# Patient Record
Sex: FEMALE | Race: OTHER | Employment: OTHER | URBAN - METROPOLITAN AREA
[De-identification: names, ages, dates, MRNs, and addresses within clinical notes are randomized per-mention and may not be internally consistent; named-entity substitution may affect disease eponyms.]

---

## 2017-10-01 ENCOUNTER — HOSPITAL ENCOUNTER (EMERGENCY)
Facility: HOSPITAL | Age: 62
Discharge: HOME | End: 2017-10-01
Attending: EMERGENCY MEDICINE
Payer: MEDICARE

## 2017-10-01 VITALS
RESPIRATION RATE: 18 BRPM | HEIGHT: 59 IN | SYSTOLIC BLOOD PRESSURE: 134 MMHG | DIASTOLIC BLOOD PRESSURE: 89 MMHG | HEART RATE: 79 BPM | BODY MASS INDEX: 32.25 KG/M2 | WEIGHT: 160 LBS | TEMPERATURE: 97 F | OXYGEN SATURATION: 98 %

## 2017-10-01 DIAGNOSIS — T78.40XA ALLERGIC REACTION, INITIAL ENCOUNTER: Primary | ICD-10-CM

## 2017-10-01 PROCEDURE — 99283 EMERGENCY DEPT VISIT LOW MDM: CPT

## 2017-10-01 RX ORDER — PREDNISONE 20 MG/1
20 TABLET ORAL DAILY
Qty: 5 TABLET | Refills: 0 | Status: SHIPPED | OUTPATIENT
Start: 2017-10-01 | End: 2017-10-06

## 2017-10-01 RX ORDER — PREDNISONE 20 MG/1
60 TABLET ORAL ONCE
Status: COMPLETED | OUTPATIENT
Start: 2017-10-01 | End: 2017-10-01

## 2017-10-01 RX ORDER — DIPHENHYDRAMINE HCL 25 MG
25 CAPSULE ORAL ONCE
Status: COMPLETED | OUTPATIENT
Start: 2017-10-01 | End: 2017-10-01

## 2017-10-01 NOTE — ED PROVIDER NOTES
Patient Seen in: [facility] AND [facility] Emergency Department    History   Patient presents with:   Eye Visual Problem (opthalmic)    Stated Complaint: right eye swelling since friday     HPI    57-year-old female with history of GERD and hyperlipidemia present Yannick Michael ) 97.2 °F (36.2 °C) (Temporal)   Resp 18   Ht 149.9 cm (4' 11\")   Wt 72.6 kg   SpO2 98%   BMI 32.32 kg/m²         Physical Exam    General Appearance: alert, no distress  Eyes: pupils equal and round no pallor.   There is periorbital and conjunctival reza

## 2017-10-01 NOTE — ED NOTES
Pt presents to ed31 from home for right sided eye/facial swelling w/ redness. Pt denies trauma. Denies pain. States she noticed the swelling on Friday and it has gotten worse each day. Pt is aox4 w/ full rom, speaking in full sentences.

## 2018-08-12 ENCOUNTER — HOSPITAL ENCOUNTER (EMERGENCY)
Facility: HOSPITAL | Age: 63
Discharge: HOME | End: 2018-08-12
Attending: EMERGENCY MEDICINE
Payer: MEDICARE

## 2018-08-12 ENCOUNTER — APPOINTMENT (OUTPATIENT)
Dept: CT IMAGING | Facility: HOSPITAL | Age: 63
End: 2018-08-12
Attending: EMERGENCY MEDICINE
Payer: MEDICARE

## 2018-08-12 VITALS
SYSTOLIC BLOOD PRESSURE: 113 MMHG | DIASTOLIC BLOOD PRESSURE: 62 MMHG | OXYGEN SATURATION: 98 % | WEIGHT: 160 LBS | RESPIRATION RATE: 18 BRPM | HEART RATE: 79 BPM | TEMPERATURE: 99 F | BODY MASS INDEX: 32 KG/M2

## 2018-08-12 DIAGNOSIS — R10.9 ABDOMINAL PAIN OF UNKNOWN ETIOLOGY: Primary | ICD-10-CM

## 2018-08-12 LAB
ALBUMIN SERPL BCP-MCNC: 4.2 G/DL (ref 3.5–4.8)
ALP SERPL-CCNC: 88 U/L (ref 32–100)
ALT SERPL-CCNC: 28 U/L (ref 14–54)
ANION GAP SERPL CALC-SCNC: 9 MMOL/L (ref 0–18)
AST SERPL-CCNC: 30 U/L (ref 15–41)
BACTERIA UR QL AUTO: NEGATIVE /HPF
BASOPHILS # BLD: 0 K/UL (ref 0–0.2)
BASOPHILS NFR BLD: 1 %
BILIRUB DIRECT SERPL-MCNC: 0.2 MG/DL (ref 0–0.2)
BILIRUB SERPL-MCNC: 1.1 MG/DL (ref 0.3–1.2)
BILIRUB UR QL: NEGATIVE
BUN SERPL-MCNC: 13 MG/DL (ref 8–20)
BUN/CREAT SERPL: 17.3 (ref 10–20)
CALCIUM SERPL-MCNC: 9.4 MG/DL (ref 8.5–10.5)
CHLORIDE SERPL-SCNC: 103 MMOL/L (ref 95–110)
CLARITY UR: CLEAR
CO2 SERPL-SCNC: 26 MMOL/L (ref 22–32)
COLOR UR: YELLOW
CREAT SERPL-MCNC: 0.75 MG/DL (ref 0.5–1.5)
EOSINOPHIL # BLD: 0.2 K/UL (ref 0–0.7)
EOSINOPHIL NFR BLD: 3 %
ERYTHROCYTE [DISTWIDTH] IN BLOOD BY AUTOMATED COUNT: 14.6 % (ref 11–15)
GLUCOSE SERPL-MCNC: 105 MG/DL (ref 70–99)
GLUCOSE UR-MCNC: NEGATIVE MG/DL
HCT VFR BLD AUTO: 37.8 % (ref 35–48)
HGB BLD-MCNC: 12.9 G/DL (ref 12–16)
HGB UR QL STRIP.AUTO: NEGATIVE
INR BLD: 1 (ref 0.9–1.2)
KETONES UR-MCNC: NEGATIVE MG/DL
LIPASE SERPL-CCNC: 37 U/L (ref 22–51)
LYMPHOCYTES # BLD: 2.1 K/UL (ref 1–4)
LYMPHOCYTES NFR BLD: 35 %
MCH RBC QN AUTO: 30.2 PG (ref 27–32)
MCHC RBC AUTO-ENTMCNC: 34.2 G/DL (ref 32–37)
MCV RBC AUTO: 88.3 FL (ref 80–100)
MONOCYTES # BLD: 0.6 K/UL (ref 0–1)
MONOCYTES NFR BLD: 10 %
NEUTROPHILS # BLD AUTO: 3 K/UL (ref 1.8–7.7)
NEUTROPHILS NFR BLD: 52 %
NITRITE UR QL STRIP.AUTO: NEGATIVE
OSMOLALITY UR CALC.SUM OF ELEC: 286 MOSM/KG (ref 275–295)
PH UR: 6 [PH] (ref 5–8)
PLATELET # BLD AUTO: 194 K/UL (ref 140–400)
PMV BLD AUTO: 9.2 FL (ref 7.4–10.3)
POTASSIUM SERPL-SCNC: 3.7 MMOL/L (ref 3.3–5.1)
PROT SERPL-MCNC: 7.4 G/DL (ref 5.9–8.4)
PROT UR-MCNC: NEGATIVE MG/DL
PROTHROMBIN TIME: 13.1 SECONDS (ref 11.8–14.5)
RBC # BLD AUTO: 4.28 M/UL (ref 3.7–5.4)
RBC #/AREA URNS AUTO: <1 /HPF
SODIUM SERPL-SCNC: 138 MMOL/L (ref 136–144)
SP GR UR STRIP: 1.01 (ref 1–1.03)
UROBILINOGEN UR STRIP-ACNC: <2
VIT C UR-MCNC: NEGATIVE MG/DL
WBC # BLD AUTO: 5.9 K/UL (ref 4–11)
WBC #/AREA URNS AUTO: 1 /HPF

## 2018-08-12 PROCEDURE — 85025 COMPLETE CBC W/AUTO DIFF WBC: CPT | Performed by: EMERGENCY MEDICINE

## 2018-08-12 PROCEDURE — 99285 EMERGENCY DEPT VISIT HI MDM: CPT

## 2018-08-12 PROCEDURE — 85610 PROTHROMBIN TIME: CPT | Performed by: EMERGENCY MEDICINE

## 2018-08-12 PROCEDURE — 83690 ASSAY OF LIPASE: CPT | Performed by: EMERGENCY MEDICINE

## 2018-08-12 PROCEDURE — 74176 CT ABD & PELVIS W/O CONTRAST: CPT | Performed by: EMERGENCY MEDICINE

## 2018-08-12 PROCEDURE — 81001 URINALYSIS AUTO W/SCOPE: CPT | Performed by: EMERGENCY MEDICINE

## 2018-08-12 PROCEDURE — 36415 COLL VENOUS BLD VENIPUNCTURE: CPT

## 2018-08-12 PROCEDURE — 80076 HEPATIC FUNCTION PANEL: CPT | Performed by: EMERGENCY MEDICINE

## 2018-08-12 PROCEDURE — 80048 BASIC METABOLIC PNL TOTAL CA: CPT | Performed by: EMERGENCY MEDICINE

## 2018-08-12 RX ORDER — DICYCLOMINE HYDROCHLORIDE 10 MG/5ML
20 SOLUTION ORAL ONCE
Status: COMPLETED | OUTPATIENT
Start: 2018-08-12 | End: 2018-08-12

## 2018-08-13 NOTE — ED PROVIDER NOTES
Patient Seen in: [facility] AND [facility] Emergency Department    History   Patient presents with:  Abdomen/Flank Pain (GI/)    Stated Complaint: abd pain     HPI    58-year-old female with history of appendectomy and cholecystectomy who for the last 4-5 days are equal, round, and reactive to light. Cardiovascular: Normal rate, regular rhythm and intact distal pulses. Pulmonary/Chest: Effort normal. No respiratory distress. Abdominal: Soft. Tenderness to palpation of lower abdominal areas.   Mild low philip acute appendicitis. Findings consistent with atrophic left kidney. Right kidney shows stable mild hydronephrosis and hydroureter. No calcifications in the collecting system. Liver, pancreas and spleen are unremarkable.   Status post cholecystectomy   No

## 2018-08-13 NOTE — ED INITIAL ASSESSMENT (HPI)
Patient complains of abdominal discomfort and bloating. Denies constipation. +nausea since last night. No vomiting.  +chills.

## 2020-02-04 ENCOUNTER — HOSPITAL (OUTPATIENT)
Dept: OTHER | Age: 65
End: 2020-02-04

## 2020-02-10 ENCOUNTER — HOSPITAL ENCOUNTER (OUTPATIENT)
Dept: CARDIAC REHAB | Age: 65
Discharge: STILL ADMITTED OR ADMINISTRATIVE | End: 2020-02-10

## 2020-02-10 PROCEDURE — 93798 PHYS/QHP OP CAR RHAB W/ECG: CPT

## 2020-02-12 ENCOUNTER — HOSPITAL ENCOUNTER (OUTPATIENT)
Dept: CARDIAC REHAB | Age: 65
Discharge: STILL ADMITTED OR ADMINISTRATIVE | End: 2020-02-12

## 2020-02-12 PROCEDURE — 93798 PHYS/QHP OP CAR RHAB W/ECG: CPT

## 2020-02-13 ENCOUNTER — HOSPITAL ENCOUNTER (OUTPATIENT)
Dept: CARDIAC REHAB | Age: 65
Discharge: STILL ADMITTED OR ADMINISTRATIVE | End: 2020-02-13

## 2020-02-17 ENCOUNTER — HOSPITAL ENCOUNTER (OUTPATIENT)
Dept: CARDIAC REHAB | Age: 65
Discharge: STILL ADMITTED OR ADMINISTRATIVE | End: 2020-02-17

## 2020-02-17 PROCEDURE — 93798 PHYS/QHP OP CAR RHAB W/ECG: CPT

## 2020-02-18 DIAGNOSIS — Z95.2 S/P AORTIC VALVE REPLACEMENT: Primary | ICD-10-CM

## 2020-02-18 PROCEDURE — 93798 PHYS/QHP OP CAR RHAB W/ECG: CPT

## 2020-02-19 ENCOUNTER — HOSPITAL ENCOUNTER (OUTPATIENT)
Dept: CARDIAC REHAB | Age: 65
Discharge: STILL ADMITTED OR ADMINISTRATIVE | End: 2020-02-19

## 2020-02-19 DIAGNOSIS — Z95.2 S/P AORTIC VALVE REPLACEMENT: ICD-10-CM

## 2020-02-19 PROCEDURE — 93798 PHYS/QHP OP CAR RHAB W/ECG: CPT

## 2020-02-20 ENCOUNTER — HOSPITAL ENCOUNTER (OUTPATIENT)
Dept: CARDIAC REHAB | Age: 65
Discharge: STILL ADMITTED OR ADMINISTRATIVE | End: 2020-02-20

## 2020-02-20 PROCEDURE — 93798 PHYS/QHP OP CAR RHAB W/ECG: CPT

## 2020-02-24 ENCOUNTER — APPOINTMENT (OUTPATIENT)
Dept: CARDIAC REHAB | Age: 65
End: 2020-02-24

## 2020-02-24 ENCOUNTER — TELEPHONE (OUTPATIENT)
Dept: CARDIAC REHAB | Age: 65
End: 2020-02-24

## 2020-02-26 ENCOUNTER — HOSPITAL ENCOUNTER (OUTPATIENT)
Dept: CARDIAC REHAB | Age: 65
Discharge: STILL ADMITTED OR ADMINISTRATIVE | End: 2020-02-26

## 2020-02-26 PROCEDURE — 93798 PHYS/QHP OP CAR RHAB W/ECG: CPT

## 2020-02-27 ENCOUNTER — HOSPITAL ENCOUNTER (OUTPATIENT)
Dept: CARDIAC REHAB | Age: 65
Discharge: STILL ADMITTED OR ADMINISTRATIVE | End: 2020-02-27

## 2020-02-27 PROCEDURE — 93798 PHYS/QHP OP CAR RHAB W/ECG: CPT

## 2020-03-02 ENCOUNTER — HOSPITAL ENCOUNTER (OUTPATIENT)
Dept: CARDIAC REHAB | Age: 65
Discharge: STILL ADMITTED OR ADMINISTRATIVE | End: 2020-03-02

## 2020-03-02 PROCEDURE — 93798 PHYS/QHP OP CAR RHAB W/ECG: CPT

## 2020-03-04 ENCOUNTER — HOSPITAL ENCOUNTER (OUTPATIENT)
Dept: CARDIAC REHAB | Age: 65
Discharge: STILL ADMITTED OR ADMINISTRATIVE | End: 2020-03-04

## 2020-03-04 PROCEDURE — 93798 PHYS/QHP OP CAR RHAB W/ECG: CPT

## 2020-03-05 ENCOUNTER — HOSPITAL ENCOUNTER (OUTPATIENT)
Dept: CARDIAC REHAB | Age: 65
Discharge: STILL ADMITTED OR ADMINISTRATIVE | End: 2020-03-05

## 2020-03-05 PROCEDURE — 93798 PHYS/QHP OP CAR RHAB W/ECG: CPT

## 2020-03-09 ENCOUNTER — HOSPITAL ENCOUNTER (OUTPATIENT)
Dept: CARDIAC REHAB | Age: 65
Discharge: STILL ADMITTED OR ADMINISTRATIVE | End: 2020-03-09

## 2020-03-09 PROCEDURE — 93798 PHYS/QHP OP CAR RHAB W/ECG: CPT

## 2020-03-11 ENCOUNTER — HOSPITAL ENCOUNTER (OUTPATIENT)
Dept: CARDIAC REHAB | Age: 65
Discharge: STILL ADMITTED OR ADMINISTRATIVE | End: 2020-03-11

## 2020-03-11 PROCEDURE — 93798 PHYS/QHP OP CAR RHAB W/ECG: CPT

## 2020-03-12 ENCOUNTER — HOSPITAL ENCOUNTER (OUTPATIENT)
Dept: CARDIAC REHAB | Age: 65
Discharge: STILL ADMITTED OR ADMINISTRATIVE | End: 2020-03-12

## 2020-03-12 PROCEDURE — 93798 PHYS/QHP OP CAR RHAB W/ECG: CPT

## 2020-03-16 ENCOUNTER — HOSPITAL ENCOUNTER (OUTPATIENT)
Dept: CARDIAC REHAB | Age: 65
Discharge: STILL ADMITTED OR ADMINISTRATIVE | End: 2020-03-16

## 2020-03-16 PROCEDURE — 93798 PHYS/QHP OP CAR RHAB W/ECG: CPT

## 2020-03-18 ENCOUNTER — APPOINTMENT (OUTPATIENT)
Dept: CARDIAC REHAB | Age: 65
End: 2020-03-18

## 2020-03-19 ENCOUNTER — APPOINTMENT (OUTPATIENT)
Dept: CARDIAC REHAB | Age: 65
End: 2020-03-19

## 2020-03-23 ENCOUNTER — APPOINTMENT (OUTPATIENT)
Dept: CARDIAC REHAB | Age: 65
End: 2020-03-23

## 2020-03-25 ENCOUNTER — APPOINTMENT (OUTPATIENT)
Dept: CARDIAC REHAB | Age: 65
End: 2020-03-25

## 2020-03-26 ENCOUNTER — APPOINTMENT (OUTPATIENT)
Dept: CARDIAC REHAB | Age: 65
End: 2020-03-26

## 2020-03-30 ENCOUNTER — TELEPHONE (OUTPATIENT)
Dept: CARDIAC REHAB | Age: 65
End: 2020-03-30

## 2020-03-30 ENCOUNTER — APPOINTMENT (OUTPATIENT)
Dept: CARDIAC REHAB | Age: 65
End: 2020-03-30

## 2020-04-01 ENCOUNTER — APPOINTMENT (OUTPATIENT)
Dept: CARDIAC REHAB | Age: 65
End: 2020-04-01

## 2020-04-01 ENCOUNTER — TELEPHONE (OUTPATIENT)
Dept: CARDIAC REHAB | Age: 65
End: 2020-04-01

## 2020-04-02 ENCOUNTER — APPOINTMENT (OUTPATIENT)
Dept: CARDIAC REHAB | Age: 65
End: 2020-04-02

## 2020-04-06 ENCOUNTER — APPOINTMENT (OUTPATIENT)
Dept: CARDIAC REHAB | Age: 65
End: 2020-04-06

## 2020-04-08 ENCOUNTER — APPOINTMENT (OUTPATIENT)
Dept: CARDIAC REHAB | Age: 65
End: 2020-04-08

## 2020-04-09 ENCOUNTER — APPOINTMENT (OUTPATIENT)
Dept: CARDIAC REHAB | Age: 65
End: 2020-04-09

## 2020-04-13 ENCOUNTER — APPOINTMENT (OUTPATIENT)
Dept: CARDIAC REHAB | Age: 65
End: 2020-04-13

## 2020-04-15 ENCOUNTER — APPOINTMENT (OUTPATIENT)
Dept: CARDIAC REHAB | Age: 65
End: 2020-04-15

## 2020-04-15 ENCOUNTER — TELEPHONE (OUTPATIENT)
Dept: CARDIAC REHAB | Age: 65
End: 2020-04-15

## 2020-04-16 ENCOUNTER — APPOINTMENT (OUTPATIENT)
Dept: CARDIAC REHAB | Age: 65
End: 2020-04-16

## 2020-04-20 ENCOUNTER — APPOINTMENT (OUTPATIENT)
Dept: CARDIAC REHAB | Age: 65
End: 2020-04-20

## 2020-04-22 ENCOUNTER — APPOINTMENT (OUTPATIENT)
Dept: CARDIAC REHAB | Age: 65
End: 2020-04-22

## 2020-04-22 ENCOUNTER — TELEPHONE (OUTPATIENT)
Dept: CARDIAC REHAB | Age: 65
End: 2020-04-22

## 2020-04-23 ENCOUNTER — APPOINTMENT (OUTPATIENT)
Dept: CARDIAC REHAB | Age: 65
End: 2020-04-23

## 2020-04-27 ENCOUNTER — APPOINTMENT (OUTPATIENT)
Dept: CARDIAC REHAB | Age: 65
End: 2020-04-27

## 2020-04-29 ENCOUNTER — APPOINTMENT (OUTPATIENT)
Dept: CARDIAC REHAB | Age: 65
End: 2020-04-29

## 2020-04-29 ENCOUNTER — TELEPHONE (OUTPATIENT)
Dept: CARDIAC REHAB | Age: 65
End: 2020-04-29

## 2020-04-30 ENCOUNTER — APPOINTMENT (OUTPATIENT)
Dept: CARDIAC REHAB | Age: 65
End: 2020-04-30

## 2020-05-13 ENCOUNTER — APPOINTMENT (OUTPATIENT)
Dept: CARDIAC REHAB | Age: 65
End: 2020-05-13
Attending: INTERNAL MEDICINE

## 2020-05-14 ENCOUNTER — APPOINTMENT (OUTPATIENT)
Dept: CARDIAC REHAB | Age: 65
End: 2020-05-14

## 2020-05-15 ENCOUNTER — TELEPHONE (OUTPATIENT)
Dept: CARDIAC REHAB | Age: 65
End: 2020-05-15

## 2020-05-18 ENCOUNTER — APPOINTMENT (OUTPATIENT)
Dept: CARDIAC REHAB | Age: 65
End: 2020-05-18

## 2020-05-20 ENCOUNTER — APPOINTMENT (OUTPATIENT)
Dept: CARDIAC REHAB | Age: 65
End: 2020-05-20

## 2020-05-21 ENCOUNTER — APPOINTMENT (OUTPATIENT)
Dept: CARDIAC REHAB | Age: 65
End: 2020-05-21

## 2020-05-27 ENCOUNTER — APPOINTMENT (OUTPATIENT)
Dept: CARDIAC REHAB | Age: 65
End: 2020-05-27

## 2020-05-28 ENCOUNTER — APPOINTMENT (OUTPATIENT)
Dept: CARDIAC REHAB | Age: 65
End: 2020-05-28

## 2020-05-30 ENCOUNTER — TELEPHONE (OUTPATIENT)
Dept: CARDIAC REHAB | Age: 65
End: 2020-05-30

## 2020-06-01 ENCOUNTER — APPOINTMENT (OUTPATIENT)
Dept: CARDIAC REHAB | Age: 65
End: 2020-06-01
Attending: INTERNAL MEDICINE

## 2020-06-03 ENCOUNTER — APPOINTMENT (OUTPATIENT)
Dept: CARDIAC REHAB | Age: 65
End: 2020-06-03

## 2020-06-04 ENCOUNTER — APPOINTMENT (OUTPATIENT)
Dept: CARDIAC REHAB | Age: 65
End: 2020-06-04

## 2020-06-08 ENCOUNTER — APPOINTMENT (OUTPATIENT)
Dept: CARDIAC REHAB | Age: 65
End: 2020-06-08

## 2020-06-10 ENCOUNTER — APPOINTMENT (OUTPATIENT)
Dept: CARDIAC REHAB | Age: 65
End: 2020-06-10

## 2020-06-11 ENCOUNTER — TELEPHONE (OUTPATIENT)
Dept: CARDIAC REHAB | Age: 65
End: 2020-06-11

## 2020-06-11 ENCOUNTER — APPOINTMENT (OUTPATIENT)
Dept: CARDIAC REHAB | Age: 65
End: 2020-06-11

## 2020-06-15 ENCOUNTER — APPOINTMENT (OUTPATIENT)
Dept: CARDIAC REHAB | Age: 65
End: 2020-06-15
Attending: INTERNAL MEDICINE

## 2020-06-17 ENCOUNTER — APPOINTMENT (OUTPATIENT)
Dept: CARDIAC REHAB | Age: 65
End: 2020-06-17

## 2020-07-06 ENCOUNTER — HOSPITAL ENCOUNTER (OUTPATIENT)
Dept: CARDIAC REHAB | Age: 65
Discharge: STILL ADMITTED OR ADMINISTRATIVE | End: 2020-07-06

## 2020-07-06 ENCOUNTER — APPOINTMENT (OUTPATIENT)
Dept: CARDIAC REHAB | Age: 65
End: 2020-07-06
Attending: INTERNAL MEDICINE

## 2020-07-06 PROCEDURE — 93798 PHYS/QHP OP CAR RHAB W/ECG: CPT

## 2020-07-08 ENCOUNTER — APPOINTMENT (OUTPATIENT)
Dept: CARDIAC REHAB | Age: 65
End: 2020-07-08

## 2020-07-08 ENCOUNTER — HOSPITAL ENCOUNTER (OUTPATIENT)
Dept: CARDIAC REHAB | Age: 65
Discharge: STILL ADMITTED OR ADMINISTRATIVE | End: 2020-07-08

## 2020-07-08 PROCEDURE — 93798 PHYS/QHP OP CAR RHAB W/ECG: CPT

## 2020-07-09 ENCOUNTER — APPOINTMENT (OUTPATIENT)
Dept: CARDIAC REHAB | Age: 65
End: 2020-07-09

## 2020-07-13 ENCOUNTER — APPOINTMENT (OUTPATIENT)
Dept: CARDIAC REHAB | Age: 65
End: 2020-07-13

## 2020-07-13 ENCOUNTER — HOSPITAL ENCOUNTER (OUTPATIENT)
Dept: CARDIAC REHAB | Age: 65
Discharge: STILL ADMITTED OR ADMINISTRATIVE | End: 2020-07-13

## 2020-07-13 PROCEDURE — 93798 PHYS/QHP OP CAR RHAB W/ECG: CPT

## 2020-07-15 ENCOUNTER — APPOINTMENT (OUTPATIENT)
Dept: CARDIAC REHAB | Age: 65
End: 2020-07-15

## 2020-07-15 ENCOUNTER — HOSPITAL ENCOUNTER (OUTPATIENT)
Dept: CARDIAC REHAB | Age: 65
Discharge: STILL ADMITTED OR ADMINISTRATIVE | End: 2020-07-15

## 2020-07-15 PROCEDURE — 93798 PHYS/QHP OP CAR RHAB W/ECG: CPT

## 2020-07-16 ENCOUNTER — APPOINTMENT (OUTPATIENT)
Dept: CARDIAC REHAB | Age: 65
End: 2020-07-16

## 2020-07-20 ENCOUNTER — APPOINTMENT (OUTPATIENT)
Dept: CARDIAC REHAB | Age: 65
End: 2020-07-20

## 2020-07-20 ENCOUNTER — HOSPITAL ENCOUNTER (OUTPATIENT)
Dept: CARDIAC REHAB | Age: 65
Discharge: STILL ADMITTED OR ADMINISTRATIVE | End: 2020-07-20

## 2020-07-20 PROCEDURE — 93798 PHYS/QHP OP CAR RHAB W/ECG: CPT

## 2020-07-22 ENCOUNTER — APPOINTMENT (OUTPATIENT)
Dept: CARDIAC REHAB | Age: 65
End: 2020-07-22

## 2020-07-22 ENCOUNTER — HOSPITAL ENCOUNTER (OUTPATIENT)
Dept: CARDIAC REHAB | Age: 65
Discharge: STILL ADMITTED OR ADMINISTRATIVE | End: 2020-07-22

## 2020-07-22 PROCEDURE — 93798 PHYS/QHP OP CAR RHAB W/ECG: CPT

## 2020-07-23 ENCOUNTER — APPOINTMENT (OUTPATIENT)
Dept: CARDIAC REHAB | Age: 65
End: 2020-07-23

## 2020-07-27 ENCOUNTER — APPOINTMENT (OUTPATIENT)
Dept: CARDIAC REHAB | Age: 65
End: 2020-07-27

## 2020-07-27 ENCOUNTER — HOSPITAL ENCOUNTER (OUTPATIENT)
Dept: CARDIAC REHAB | Age: 65
Discharge: STILL ADMITTED OR ADMINISTRATIVE | End: 2020-07-27

## 2020-07-27 PROCEDURE — 93798 PHYS/QHP OP CAR RHAB W/ECG: CPT

## 2020-07-29 ENCOUNTER — APPOINTMENT (OUTPATIENT)
Dept: CARDIAC REHAB | Age: 65
End: 2020-07-29

## 2020-07-29 ENCOUNTER — HOSPITAL ENCOUNTER (OUTPATIENT)
Dept: CARDIAC REHAB | Age: 65
Discharge: STILL ADMITTED OR ADMINISTRATIVE | End: 2020-07-29

## 2020-07-29 PROCEDURE — 93798 PHYS/QHP OP CAR RHAB W/ECG: CPT

## 2020-07-30 ENCOUNTER — APPOINTMENT (OUTPATIENT)
Dept: CARDIAC REHAB | Age: 65
End: 2020-07-30

## 2020-08-03 ENCOUNTER — APPOINTMENT (OUTPATIENT)
Dept: CARDIAC REHAB | Age: 65
End: 2020-08-03

## 2020-08-03 ENCOUNTER — TELEPHONE (OUTPATIENT)
Dept: CARDIAC REHAB | Age: 65
End: 2020-08-03

## 2020-08-05 ENCOUNTER — APPOINTMENT (OUTPATIENT)
Dept: CARDIAC REHAB | Age: 65
End: 2020-08-05

## 2020-08-05 ENCOUNTER — TELEPHONE (OUTPATIENT)
Dept: CARDIAC REHAB | Age: 65
End: 2020-08-05

## 2020-08-06 ENCOUNTER — APPOINTMENT (OUTPATIENT)
Dept: CARDIAC REHAB | Age: 65
End: 2020-08-06

## 2020-08-07 ENCOUNTER — TELEPHONE (OUTPATIENT)
Dept: CARDIAC REHAB | Age: 65
End: 2020-08-07

## 2020-08-10 ENCOUNTER — APPOINTMENT (OUTPATIENT)
Dept: CARDIAC REHAB | Age: 65
End: 2020-08-10

## 2020-08-12 ENCOUNTER — APPOINTMENT (OUTPATIENT)
Dept: CARDIAC REHAB | Age: 65
End: 2020-08-12

## 2020-08-13 ENCOUNTER — APPOINTMENT (OUTPATIENT)
Dept: CARDIAC REHAB | Age: 65
End: 2020-08-13

## 2020-08-17 ENCOUNTER — APPOINTMENT (OUTPATIENT)
Dept: CARDIAC REHAB | Age: 65
End: 2020-08-17

## 2020-08-17 ENCOUNTER — HOSPITAL ENCOUNTER (OUTPATIENT)
Dept: CARDIAC REHAB | Age: 65
Discharge: STILL ADMITTED OR ADMINISTRATIVE | End: 2020-08-17
Attending: INTERNAL MEDICINE

## 2020-08-17 PROCEDURE — 93798 PHYS/QHP OP CAR RHAB W/ECG: CPT

## 2020-08-18 ENCOUNTER — HOSPITAL ENCOUNTER (OUTPATIENT)
Dept: CARDIAC REHAB | Age: 65
Discharge: STILL ADMITTED OR ADMINISTRATIVE | End: 2020-08-18
Attending: INTERNAL MEDICINE

## 2020-08-18 PROCEDURE — 93798 PHYS/QHP OP CAR RHAB W/ECG: CPT

## 2020-08-19 ENCOUNTER — APPOINTMENT (OUTPATIENT)
Dept: CARDIAC REHAB | Age: 65
End: 2020-08-19

## 2020-08-19 ENCOUNTER — HOSPITAL ENCOUNTER (OUTPATIENT)
Dept: CARDIAC REHAB | Age: 65
Discharge: STILL ADMITTED OR ADMINISTRATIVE | End: 2020-08-19
Attending: INTERNAL MEDICINE

## 2020-08-19 PROCEDURE — 93798 PHYS/QHP OP CAR RHAB W/ECG: CPT

## 2020-08-20 ENCOUNTER — HOSPITAL ENCOUNTER (OUTPATIENT)
Dept: CARDIAC REHAB | Age: 65
Discharge: STILL ADMITTED OR ADMINISTRATIVE | End: 2020-08-20
Attending: INTERNAL MEDICINE

## 2020-08-20 PROCEDURE — 93798 PHYS/QHP OP CAR RHAB W/ECG: CPT

## 2020-08-24 ENCOUNTER — APPOINTMENT (OUTPATIENT)
Dept: CARDIAC REHAB | Age: 65
End: 2020-08-24

## 2020-08-25 ENCOUNTER — HOSPITAL ENCOUNTER (OUTPATIENT)
Dept: CARDIAC REHAB | Age: 65
Discharge: STILL ADMITTED OR ADMINISTRATIVE | End: 2020-08-25
Attending: INTERNAL MEDICINE

## 2020-08-25 PROCEDURE — 93798 PHYS/QHP OP CAR RHAB W/ECG: CPT

## 2020-08-26 ENCOUNTER — APPOINTMENT (OUTPATIENT)
Dept: CARDIAC REHAB | Age: 65
End: 2020-08-26

## 2020-08-27 ENCOUNTER — HOSPITAL ENCOUNTER (OUTPATIENT)
Dept: CARDIAC REHAB | Age: 65
Discharge: STILL ADMITTED OR ADMINISTRATIVE | End: 2020-08-27
Attending: INTERNAL MEDICINE

## 2020-08-27 PROCEDURE — 93798 PHYS/QHP OP CAR RHAB W/ECG: CPT

## 2020-08-31 ENCOUNTER — APPOINTMENT (OUTPATIENT)
Dept: CARDIAC REHAB | Age: 65
End: 2020-08-31

## 2020-09-01 ENCOUNTER — HOSPITAL ENCOUNTER (OUTPATIENT)
Dept: CARDIAC REHAB | Age: 65
Discharge: STILL ADMITTED OR ADMINISTRATIVE | End: 2020-09-01
Attending: INTERNAL MEDICINE

## 2020-09-01 PROCEDURE — 93798 PHYS/QHP OP CAR RHAB W/ECG: CPT

## 2020-09-02 ENCOUNTER — APPOINTMENT (OUTPATIENT)
Dept: CARDIAC REHAB | Age: 65
End: 2020-09-02

## 2020-09-03 ENCOUNTER — HOSPITAL ENCOUNTER (OUTPATIENT)
Dept: CARDIAC REHAB | Age: 65
Discharge: STILL ADMITTED OR ADMINISTRATIVE | End: 2020-09-03
Attending: INTERNAL MEDICINE

## 2020-09-03 PROCEDURE — 93798 PHYS/QHP OP CAR RHAB W/ECG: CPT

## 2020-09-08 ENCOUNTER — HOSPITAL ENCOUNTER (OUTPATIENT)
Dept: CARDIAC REHAB | Age: 65
Discharge: STILL ADMITTED OR ADMINISTRATIVE | End: 2020-09-08
Attending: INTERNAL MEDICINE

## 2020-09-08 PROCEDURE — 93798 PHYS/QHP OP CAR RHAB W/ECG: CPT

## 2020-09-09 ENCOUNTER — HOSPITAL ENCOUNTER (OUTPATIENT)
Dept: CARDIAC REHAB | Age: 65
Discharge: STILL ADMITTED OR ADMINISTRATIVE | End: 2020-09-09
Attending: INTERNAL MEDICINE

## 2020-09-09 ENCOUNTER — APPOINTMENT (OUTPATIENT)
Dept: CARDIAC REHAB | Age: 65
End: 2020-09-09
Attending: INTERNAL MEDICINE

## 2020-09-09 ENCOUNTER — APPOINTMENT (OUTPATIENT)
Dept: CARDIAC REHAB | Age: 65
End: 2020-09-09

## 2020-09-09 PROCEDURE — 93798 PHYS/QHP OP CAR RHAB W/ECG: CPT

## 2020-09-10 ENCOUNTER — APPOINTMENT (OUTPATIENT)
Dept: CARDIAC REHAB | Age: 65
End: 2020-09-10

## 2020-09-14 ENCOUNTER — APPOINTMENT (OUTPATIENT)
Dept: CARDIAC REHAB | Age: 65
End: 2020-09-14

## 2021-05-11 ENCOUNTER — OFFICE VISIT (OUTPATIENT)
Dept: FAMILY MEDICINE CLINIC | Facility: CLINIC | Age: 66
End: 2021-05-11
Payer: MEDICARE

## 2021-05-11 VITALS
WEIGHT: 166 LBS | TEMPERATURE: 98 F | HEIGHT: 61 IN | RESPIRATION RATE: 14 BRPM | BODY MASS INDEX: 31.34 KG/M2 | OXYGEN SATURATION: 98 % | SYSTOLIC BLOOD PRESSURE: 138 MMHG | HEART RATE: 76 BPM | DIASTOLIC BLOOD PRESSURE: 78 MMHG

## 2021-05-11 DIAGNOSIS — L08.9 LOCAL SKIN INFECTION: Primary | ICD-10-CM

## 2021-05-11 PROCEDURE — 99202 OFFICE O/P NEW SF 15 MIN: CPT | Performed by: PHYSICIAN ASSISTANT

## 2021-05-11 RX ORDER — ASPIRIN 81 MG/1
81 TABLET ORAL DAILY
COMMUNITY

## 2021-05-11 RX ORDER — OMEPRAZOLE 20 MG/1
20 CAPSULE, DELAYED RELEASE ORAL DAILY
COMMUNITY
Start: 2020-12-13 | End: 2021-12-13

## 2021-05-11 RX ORDER — METOPROLOL SUCCINATE 25 MG/1
25 TABLET, EXTENDED RELEASE ORAL DAILY
COMMUNITY
Start: 2021-01-22

## 2021-05-11 RX ORDER — ATORVASTATIN CALCIUM 40 MG/1
40 TABLET, FILM COATED ORAL EVERY EVENING
COMMUNITY
Start: 2020-12-13

## 2021-05-11 RX ORDER — B-COMPLEX WITH VITAMIN C
1 TABLET ORAL DAILY
COMMUNITY

## 2021-05-11 RX ORDER — SULFAMETHOXAZOLE AND TRIMETHOPRIM 800; 160 MG/1; MG/1
1 TABLET ORAL 2 TIMES DAILY
Qty: 14 TABLET | Refills: 0 | Status: SHIPPED | OUTPATIENT
Start: 2021-05-11 | End: 2021-05-18

## 2021-05-11 NOTE — PROGRESS NOTES
CHIEF COMPLAINT:   Patient presents with:  Skin: right arm      HPI:     Fidelina Canales is a 76year old female who presents with concerns of red, hot, painful lump on R forearm. Patient states symptoms present 3  days.   Reports trying to pick at a spot on her SYSTEMS:   GENERAL: feels well otherwise  SKIN: as above. MUSC/SKEL: no joint swelling, no joint stiffness. NEURO: no abnormal sensation, no tingling of the skin or numbness.     EXAM:   /78   Pulse 76   Temp 98 °F (36.7 °C)   Resp 14   Ht 5' 1\" (1 piel, puede ser un problema jorge luis. Sin tratamiento, la celulitis puede entrar en el torrente sanguíneo y en los ganglios linfáticos. De esta manera, la infección se puede diseminar por todo el cuerpo, lo que causa latonia enfermedad grave.    La celulitis produ o hinchazón que van en aumento;  · secreción de la piel (pus);  · fiebre superior a los 100.4 °F (38.0 °C) después de dos días de jyotsna antibióticos. © 0069-1854 [employer] [address] Todos los derechos reservados.  Esta información no pretende sustit personal médico.  Cuándo debe llamar a lewis médico  Llame a lewis médico de inmediato si nota cualquiera de estos síntomas:  · Vómito  · Fiebre de 100.4°F (38°C) o más, o lo que le indique el proveedor de atención médica  · Escalofríos temblorosos  · Enrojecimi bandages as advised. · Take your temperature once a day for a week. · Wash your hands often to prevent spreading the infection. In the future, wash your hands before and after you touch cuts, scratches, or bandages.  This will help prevent infection.   LOIDA symptoms go away. Home care  Follow these tips:  · Limit the use of the part of your body with cellulitis.   · If the infection is on your leg, keep your leg raised while sitting. This helps reduce swelling.   · Take all of the antibiotic medicine exactly

## 2021-05-11 NOTE — PATIENT INSTRUCTIONS
Celulitis  La celulitis es tracie infección de las capas profundas de la piel. Tracie rotura en la piel, tony un errol o un raspón, puede permitir la entrada de bacterias debajo de la piel.  Si las bacterias alcanzan las capas profundas de la piel, puede ser un proveedor de [facility] recetará [employer].    Cuándo buscar atención médica  Llame a lewis proveedor de atención médica de inmediato ante cualquiera de los siguientes síntomas:   · enrojecimiento que se está extendiendo;  · dolor o hinchazón que v frecuentemente las chencho. En el futuro, lávese las chencho antes y después de tocar cualquier tipo de cortadas, rasguños o vendajes. [place] le ayudará a prevenir infecciones.   Visitas de control  Programe latonia visita de control según le indique el personal méd clean.  · When possible, raise the infected area above the level of your heart. This helps keep swelling down. · Talk with your healthcare provider if you are in pain. Ask what kind of over-the-counter medicine you can take for pain.   · Apply clean bandag open sore may be cleaned and covered with cool wet gauze. Symptoms should get better 1 to 2 days after treatment is started. Make sure to take all the antibiotics for the full number of days until they are gone.  Keep taking the medicine even if your sympto

## 2021-05-12 ENCOUNTER — TELEPHONE (OUTPATIENT)
Dept: FAMILY MEDICINE CLINIC | Facility: CLINIC | Age: 66
End: 2021-05-12

## 2021-05-12 NOTE — TELEPHONE ENCOUNTER
Patient's daughter calling with questions regarding possible worsening symptoms. Patient was seen yesterday at [facility]. Daughter reports redness appears to be spreading on one side- about 1cm past line that was drawn. Denies fever, chills, N/V.  Denies wors

## 2022-12-01 ENCOUNTER — OFFICE VISIT (OUTPATIENT)
Dept: FAMILY MEDICINE CLINIC | Facility: CLINIC | Age: 67
End: 2022-12-01
Payer: MEDICARE

## 2022-12-01 VITALS
RESPIRATION RATE: 16 BRPM | SYSTOLIC BLOOD PRESSURE: 151 MMHG | TEMPERATURE: 97 F | DIASTOLIC BLOOD PRESSURE: 71 MMHG | WEIGHT: 164 LBS | BODY MASS INDEX: 30.96 KG/M2 | OXYGEN SATURATION: 98 % | HEIGHT: 61 IN | HEART RATE: 91 BPM

## 2022-12-01 DIAGNOSIS — J06.9 VIRAL URI WITH COUGH: Primary | ICD-10-CM

## 2022-12-01 DIAGNOSIS — Z20.822 EXPOSURE TO COVID-19 VIRUS: ICD-10-CM

## 2022-12-01 LAB
CONTROL LINE PRESENT WITH A CLEAR BACKGROUND (YES/NO): YES YES/NO
KIT EXPIRATION DATE: NORMAL DATE
STREP GRP A CUL-SCR: NEGATIVE

## 2022-12-01 PROCEDURE — 87637 SARSCOV2&INF A&B&RSV AMP PRB: CPT | Performed by: NURSE PRACTITIONER

## 2022-12-01 PROCEDURE — 99213 OFFICE O/P EST LOW 20 MIN: CPT | Performed by: NURSE PRACTITIONER

## 2022-12-01 PROCEDURE — 87880 STREP A ASSAY W/OPTIC: CPT | Performed by: NURSE PRACTITIONER

## 2022-12-01 RX ORDER — OMEPRAZOLE 20 MG/1
CAPSULE, DELAYED RELEASE ORAL
COMMUNITY
Start: 2022-08-17

## 2022-12-01 RX ORDER — AMLODIPINE BESYLATE 5 MG/1
TABLET ORAL
COMMUNITY
Start: 2022-11-02

## 2022-12-02 LAB
FLUAV + FLUBV RNA SPEC NAA+PROBE: NOT DETECTED
FLUAV + FLUBV RNA SPEC NAA+PROBE: NOT DETECTED
RSV RNA SPEC NAA+PROBE: NOT DETECTED
SARS-COV-2 RNA RESP QL NAA+PROBE: DETECTED

## 2023-10-22 ENCOUNTER — APPOINTMENT (OUTPATIENT)
Dept: CT IMAGING | Facility: HOSPITAL | Age: 68
End: 2023-10-22
Attending: EMERGENCY MEDICINE
Payer: MEDICARE

## 2023-10-22 ENCOUNTER — HOSPITAL ENCOUNTER (EMERGENCY)
Facility: HOSPITAL | Age: 68
Discharge: HOME | End: 2023-10-22
Attending: EMERGENCY MEDICINE
Payer: MEDICARE

## 2023-10-22 ENCOUNTER — OFFICE VISIT (OUTPATIENT)
Dept: FAMILY MEDICINE CLINIC | Facility: CLINIC | Age: 68
End: 2023-10-22
Payer: MEDICARE

## 2023-10-22 VITALS
DIASTOLIC BLOOD PRESSURE: 63 MMHG | HEIGHT: 61 IN | HEART RATE: 84 BPM | WEIGHT: 160 LBS | BODY MASS INDEX: 30.21 KG/M2 | SYSTOLIC BLOOD PRESSURE: 124 MMHG | TEMPERATURE: 98 F | RESPIRATION RATE: 16 BRPM | OXYGEN SATURATION: 92 %

## 2023-10-22 VITALS
RESPIRATION RATE: 18 BRPM | WEIGHT: 164 LBS | HEIGHT: 61 IN | TEMPERATURE: 100 F | DIASTOLIC BLOOD PRESSURE: 83 MMHG | SYSTOLIC BLOOD PRESSURE: 150 MMHG | HEART RATE: 97 BPM | OXYGEN SATURATION: 100 % | BODY MASS INDEX: 30.96 KG/M2

## 2023-10-22 DIAGNOSIS — R10.9 ABDOMINAL PAIN, ACUTE: Primary | ICD-10-CM

## 2023-10-22 DIAGNOSIS — R68.83 CHILLS: ICD-10-CM

## 2023-10-22 DIAGNOSIS — R10.30 LOWER ABDOMINAL PAIN: Primary | ICD-10-CM

## 2023-10-22 LAB
ALBUMIN SERPL-MCNC: 3.5 G/DL (ref 3.4–5)
ALBUMIN/GLOB SERPL: 0.7 {RATIO} (ref 1–2)
ALP LIVER SERPL-CCNC: 96 U/L
ALT SERPL-CCNC: 44 U/L
ANION GAP SERPL CALC-SCNC: 10 MMOL/L (ref 0–18)
AST SERPL-CCNC: 30 U/L (ref 15–37)
BASOPHILS # BLD AUTO: 0.04 X10(3) UL (ref 0–0.2)
BASOPHILS NFR BLD AUTO: 0.6 %
BILIRUB SERPL-MCNC: 0.7 MG/DL (ref 0.1–2)
BILIRUB UR QL: NEGATIVE
BUN BLD-MCNC: 14 MG/DL (ref 7–18)
BUN/CREAT SERPL: 16.7 (ref 10–20)
CALCIUM BLD-MCNC: 9.2 MG/DL (ref 8.5–10.1)
CHLORIDE SERPL-SCNC: 106 MMOL/L (ref 98–112)
CLARITY UR: CLEAR
CO2 SERPL-SCNC: 25 MMOL/L (ref 21–32)
COLOR UR: COLORLESS
CREAT BLD-MCNC: 0.84 MG/DL
DEPRECATED RDW RBC AUTO: 47.8 FL (ref 35.1–46.3)
EGFRCR SERPLBLD CKD-EPI 2021: 76 ML/MIN/1.73M2 (ref 60–?)
EOSINOPHIL # BLD AUTO: 0.17 X10(3) UL (ref 0–0.7)
EOSINOPHIL NFR BLD AUTO: 2.4 %
ERYTHROCYTE [DISTWIDTH] IN BLOOD BY AUTOMATED COUNT: 14.3 % (ref 11–15)
GLOBULIN PLAS-MCNC: 4.7 G/DL (ref 2.8–4.4)
GLUCOSE BLD-MCNC: 82 MG/DL (ref 70–99)
GLUCOSE UR-MCNC: NORMAL MG/DL
HCT VFR BLD AUTO: 36.6 %
HGB BLD-MCNC: 12.1 G/DL
IMM GRANULOCYTES # BLD AUTO: 0.02 X10(3) UL (ref 0–1)
IMM GRANULOCYTES NFR BLD: 0.3 %
KETONES UR-MCNC: NEGATIVE MG/DL
LEUKOCYTE ESTERASE UR QL STRIP.AUTO: NEGATIVE
LIPASE SERPL-CCNC: 38 U/L (ref 13–75)
LYMPHOCYTES # BLD AUTO: 1.47 X10(3) UL (ref 1–4)
LYMPHOCYTES NFR BLD AUTO: 21.1 %
MCH RBC QN AUTO: 29.9 PG (ref 26–34)
MCHC RBC AUTO-ENTMCNC: 33.1 G/DL (ref 31–37)
MCV RBC AUTO: 90.4 FL
MONOCYTES # BLD AUTO: 0.72 X10(3) UL (ref 0.1–1)
MONOCYTES NFR BLD AUTO: 10.3 %
NEUTROPHILS # BLD AUTO: 4.55 X10 (3) UL (ref 1.5–7.7)
NEUTROPHILS # BLD AUTO: 4.55 X10(3) UL (ref 1.5–7.7)
NEUTROPHILS NFR BLD AUTO: 65.3 %
NITRITE UR QL STRIP.AUTO: NEGATIVE
OSMOLALITY SERPL CALC.SUM OF ELEC: 292 MOSM/KG (ref 275–295)
PH UR: 6 [PH] (ref 5–8)
PLATELET # BLD AUTO: 187 10(3)UL (ref 150–450)
POTASSIUM SERPL-SCNC: 3.8 MMOL/L (ref 3.5–5.1)
PROT SERPL-MCNC: 8.2 G/DL (ref 6.4–8.2)
PROT UR-MCNC: NEGATIVE MG/DL
RBC # BLD AUTO: 4.05 X10(6)UL
SODIUM SERPL-SCNC: 141 MMOL/L (ref 136–145)
SP GR UR STRIP: 1 (ref 1–1.03)
UROBILINOGEN UR STRIP-ACNC: NORMAL
WBC # BLD AUTO: 7 X10(3) UL (ref 4–11)

## 2023-10-22 PROCEDURE — 81001 URINALYSIS AUTO W/SCOPE: CPT | Performed by: EMERGENCY MEDICINE

## 2023-10-22 PROCEDURE — 99284 EMERGENCY DEPT VISIT MOD MDM: CPT

## 2023-10-22 PROCEDURE — 85025 COMPLETE CBC W/AUTO DIFF WBC: CPT | Performed by: EMERGENCY MEDICINE

## 2023-10-22 PROCEDURE — 99285 EMERGENCY DEPT VISIT HI MDM: CPT

## 2023-10-22 PROCEDURE — 74176 CT ABD & PELVIS W/O CONTRAST: CPT | Performed by: EMERGENCY MEDICINE

## 2023-10-22 PROCEDURE — 80053 COMPREHEN METABOLIC PANEL: CPT | Performed by: EMERGENCY MEDICINE

## 2023-10-22 PROCEDURE — 36415 COLL VENOUS BLD VENIPUNCTURE: CPT

## 2023-10-22 PROCEDURE — 83690 ASSAY OF LIPASE: CPT | Performed by: EMERGENCY MEDICINE

## 2023-10-22 NOTE — ED INITIAL ASSESSMENT (HPI)
Pt reports Lower abdominal pain and chills/possible fever for the past 2 days. Pt sent over from IC to r/o kidney stones.   Pt reports n/v.

## 2024-01-29 ENCOUNTER — HOSPITAL ENCOUNTER (OUTPATIENT)
Age: 69
Discharge: HOME | End: 2024-01-29
Payer: MEDICARE

## 2024-01-29 VITALS
HEART RATE: 78 BPM | OXYGEN SATURATION: 98 % | DIASTOLIC BLOOD PRESSURE: 67 MMHG | SYSTOLIC BLOOD PRESSURE: 136 MMHG | TEMPERATURE: 98 F | RESPIRATION RATE: 18 BRPM

## 2024-01-29 DIAGNOSIS — U07.1 COVID: Primary | ICD-10-CM

## 2024-01-29 LAB — SARS-COV-2 RNA RESP QL NAA+PROBE: DETECTED

## 2024-01-29 PROCEDURE — U0002 COVID-19 LAB TEST NON-CDC: HCPCS | Performed by: NURSE PRACTITIONER

## 2024-01-29 PROCEDURE — 99213 OFFICE O/P EST LOW 20 MIN: CPT | Performed by: NURSE PRACTITIONER

## 2024-01-29 NOTE — ED PROVIDER NOTES
Patient Seen in: [facility]      History     Chief Complaint   Patient presents with    Nasal Congestion     Stated Complaint: covid test  Subjective:   68-year-old female presents for nasal congestion and a sore throat that started last night.  She had a positive exposure to COVID.  She is vaccinated for COVID.  She has a minimal cough.  No shortness of breath.  No chest pain.  No fevers.  She is eating and drinking normally without vomiting or diarrhea.  No neck stiffness.  No rashes.  No headaches.  No dizziness.  She appears nontoxic.      HISTORY:  Past Medical History:   Diagnosis Date    Esophageal reflux     Hx of diseases NEC 1987    Possible meningitis    Osteoarthrosis, unspecified whether generalized or localized, unspecified site     Other and unspecified hyperlipidemia       Past Surgical History:   Procedure Laterality Date    APPENDECTOMY      REMOVAL GALLBLADDER      REMOVAL OF TONSILS,12+ Y/O        Family History   Problem Relation Age of Onset    Diabetes Father     Hypertension Mother     Diabetes Brother     Other Brother         Kidney transplant - Etiology ?      Social History     Socioeconomic History    Marital status: Single   Tobacco Use    Smoking status: Never    Smokeless tobacco: Never           Review of Systems   HENT:  Positive for congestion and sore throat.    All other systems reviewed and are negative.      Positive for stated complaint: covid test  Other systems are as noted in HPI.  Constitutional and vital signs reviewed.      All other systems reviewed and negative except as noted above.    Physical Exam     ED Triage Vitals [01/29/24 1526]   /67   Pulse 78   Resp 18   Temp 98.2 °F (36.8 °C)   Temp src Temporal   SpO2 98 %   O2 Device None (Room air)     Current:/67   Pulse 78   Temp 98.2 °F (36.8 °C) (Temporal)   Resp 18   SpO2 98%     Physical Exam  Vitals and nursing note reviewed.   Constitutional:       General: She is not in acute  distress.     Appearance: Normal appearance. She is not toxic-appearing.   HENT:      Head: Normocephalic.      Right Ear: Tympanic membrane normal.      Left Ear: Tympanic membrane normal.      Nose: Congestion present. No rhinorrhea.      Mouth/Throat:      Mouth: Mucous membranes are moist.      Pharynx: Oropharynx is clear. Posterior oropharyngeal erythema present. No oropharyngeal exudate.   Eyes:      Extraocular Movements: Extraocular movements intact.      Conjunctiva/sclera: Conjunctivae normal.      Pupils: Pupils are equal, round, and reactive to light.   Cardiovascular:      Rate and Rhythm: Normal rate and regular rhythm.   Pulmonary:      Effort: Pulmonary effort is normal.      Breath sounds: Normal breath sounds.   Musculoskeletal:         General: Normal range of motion.      Cervical back: Normal range of motion and neck supple.   Lymphadenopathy:      Cervical: No cervical adenopathy.   Skin:     General: Skin is warm and dry.      Capillary Refill: Capillary refill takes less than 2 seconds.      Findings: No rash.   Neurological:      General: No focal deficit present.      Mental Status: She is alert and oriented to person, place, and time.   Psychiatric:         Mood and Affect: Mood normal.         Behavior: Behavior normal.         ED Course     Labs Reviewed   RAPID SARS-COV-2 BY PCR - Abnormal; Notable for the following components:       Result Value    Rapid SARS-CoV-2 by PCR Detected (*)     All other components within normal limits       MDM     Medical Decision Making  The patient is positive for COVID.  We discussed part of care including Tylenol as needed for pain or fever, pushing fluids, and rest.  We discussed quarantining.  She has had COVID in the past without any complications.  She did not take Paxlovid in the past, so we will avoid at this time.  She is aware she should follow-up with her primary care doctor.    Amount and/or Complexity of Data Reviewed  Labs: ordered.      Details: COVID-positive        Disposition and Plan     Clinical Impression:  1. COVID         Disposition:  Discharge  1/29/2024  4:06 pm    Follow-up:  PMD    Schedule an appointment as soon as possible for a visit   As needed          Medications Prescribed:  Current Discharge Medication List

## 2024-01-29 NOTE — ED INITIAL ASSESSMENT (HPI)
Pt with nasal congestion, dry throat, headache since last night. + chills     Patient swabbed positive for covid with at-home test

## 2024-01-29 NOTE — DISCHARGE INSTRUCTIONS
Drink plenty of fluids.  Rest.  Tylenol as needed for pain or fever  Follow-up as needed with your doctor.  Return for any concerns.

## 2024-08-06 ENCOUNTER — HOSPITAL ENCOUNTER (OUTPATIENT)
Age: 69
Discharge: HOME | End: 2024-08-06
Payer: MEDICARE

## 2024-08-06 VITALS
HEART RATE: 94 BPM | SYSTOLIC BLOOD PRESSURE: 144 MMHG | DIASTOLIC BLOOD PRESSURE: 70 MMHG | OXYGEN SATURATION: 98 % | RESPIRATION RATE: 18 BRPM | TEMPERATURE: 98 F

## 2024-08-06 DIAGNOSIS — N30.01 ACUTE CYSTITIS WITH HEMATURIA: Primary | ICD-10-CM

## 2024-08-06 LAB
BILIRUB UR QL STRIP: NEGATIVE
CLARITY UR: CLEAR
COLOR UR: YELLOW
GLUCOSE UR STRIP-MCNC: NEGATIVE MG/DL
KETONES UR STRIP-MCNC: NEGATIVE MG/DL
NITRITE UR QL STRIP: NEGATIVE
PH UR STRIP: 7 [PH]
PROT UR STRIP-MCNC: NEGATIVE MG/DL
SP GR UR STRIP: 1.01
UROBILINOGEN UR STRIP-ACNC: <2 MG/DL

## 2024-08-06 PROCEDURE — 99213 OFFICE O/P EST LOW 20 MIN: CPT

## 2024-08-06 PROCEDURE — 81002 URINALYSIS NONAUTO W/O SCOPE: CPT

## 2024-08-06 RX ORDER — CIPROFLOXACIN 500 MG/1
500 TABLET, FILM COATED ORAL 2 TIMES DAILY
Qty: 14 TABLET | Refills: 0 | Status: SHIPPED | OUTPATIENT
Start: 2024-08-06 | End: 2024-08-13

## 2024-08-06 NOTE — ED INITIAL ASSESSMENT (HPI)
Patient arrived ambulatory to room c/o urinary symptoms that started 1 week ago. Patient was dx with a UTI by her pcp and was treated with macrobid initially. Her pcp changed her to bactrim, which made her sick. Patient stopped taking the abx. Patient states she began having headaches and chills. Patient states she is still feeling slight pain when urinating. No hematuria.

## 2024-08-06 NOTE — ED PROVIDER NOTES
Patient Seen in: [facility]      History     Chief Complaint   Patient presents with    Urinary Symptoms            Stated Complaint: urinary symptoms  Subjective:   [place]ia is a 68-year-old female presenting to the immediate care with her daughter.  Patient and daughter state that the patient has had urinary tract infection symptoms for about the past week.  Patient has had urinary urgency, dysuria and urinary frequency.  She was seen by her gynecologist on 7/31 and had a positive urine test so she was started on nitrofurantoin.  Patient states that she got a call from her gynecologist office yesterday and was told she needs a different antibiotic was sent a prescription for Bactrim.  Patient states that she took 1 dose of the antibiotics yesterday and 3 to 4 hours later she began having chills, abdominal pain, headache and nausea.  No rash.  No breathing difficulty.  Patient presents today because she does not want to take the Bactrim because she thinks she reacted to it.  Patient states that she is still having some mild urinary symptoms.  She denies any vaginal discharge or bleeding but states that there is an odor from her vaginal area.  She denies any back pain or flank pain.  She does have some mild lower abdominal pressure.  Eating and drinking well and is well-hydrated.  She denies any other concerns or complaints.        Objective:   Past Medical History:    Esophageal reflux    Hx of diseases NEC    Possible meningitis    Osteoarthrosis, unspecified whether generalized or localized, unspecified site    Other and unspecified hyperlipidemia            Past Surgical History:   Procedure Laterality Date    Appendectomy      Removal gallbladder      Removal of tonsils,11+ y/o                Social History     Socioeconomic History    Marital status: Single   Tobacco Use    Smoking status: Never    Smokeless tobacco: Never            Review of Systems    Positive for stated complaint: Urinary  Symptoms (/)    Other systems are as noted in HPI.  Constitutional and vital signs reviewed.      All other systems reviewed and negative except as noted above.    Physical Exam     ED Triage Vitals [08/06/24 1711]   /70   Pulse 94   Resp 18   Temp 98.2 °F (36.8 °C)   Temp src Temporal   SpO2 98 %   O2 Device None (Room air)     Current:/70   Pulse 94   Temp 98.2 °F (36.8 °C) (Temporal)   Resp 18   SpO2 98%     Physical Exam  Vitals and nursing note reviewed. Exam conducted with a chaperone present (Brianne Barton, TIFFANY).   Constitutional:       General: She is not in acute distress.     Appearance: Normal appearance. She is not ill-appearing, toxic-appearing or diaphoretic.   HENT:      Head: Normocephalic.   Cardiovascular:      Rate and Rhythm: Normal rate.   Pulmonary:      Effort: Pulmonary effort is normal. No respiratory distress.   Abdominal:      General: Abdomen is flat. Bowel sounds are normal. There is no distension.      Palpations: Abdomen is soft. There is no mass.      Tenderness: There is abdominal tenderness in the suprapubic area. There is no right CVA tenderness, left CVA tenderness, guarding or rebound.      Hernia: No hernia is present.      Comments: Mild diffuse abdominal tenderness to the entire lower abdomen worst over the suprapubic area   Genitourinary:     Pubic Area: No rash or pubic lice.       Labia:         Right: No rash, tenderness, lesion or injury.         Left: No rash, tenderness, lesion or injury.       Vagina: Normal.      Uterus: With uterine prolapse. Not deviated, not enlarged, not fixed and not tender.       Comments: Speculum exam deferred; she was just seen by her gynecologist a few days ago.  Visual exam and vaginosis swab collection only.  Musculoskeletal:         General: Normal range of motion.      Cervical back: Normal range of motion.   Skin:     General: Skin is warm and dry.      Capillary Refill: Capillary refill takes less than 2 seconds.    Neurological:      General: No focal deficit present.      Mental Status: She is alert and oriented to person, place, and time.   Psychiatric:         Mood and Affect: Mood normal.         Behavior: Behavior normal.         Thought Content: Thought content normal.         Judgment: Judgment normal.         ED Course   Radiology:  No results found.  Labs Reviewed   [facility] POCT URINALYSIS DIPSTICK - Abnormal; Notable for the following components:       Result Value    Blood, Urine Trace-Intact (*)     Leukocyte esterase urine Trace (*)     All other components within normal limits   URINE CULTURE, ROUTINE   VAGINITIS VAGINOSIS PCR PANEL       MDM     Medical Decision Making  Differential diagnoses reflecting the complexity of care include but are not limited to UTI, pyelonephritis, kidney stone, vaginosis.    Comorbidities that add complexity to management include: UTI with culture with multiple resistant antibiotics  History obtained by an independent source was from: Patient and daughter  My interpretations of outside records/studies include: Note from recent gynecology visit, culture results, bacterial vaginosis result  Patient is well appearing, non-toxic and in no acute distress.  Vital signs are stable.   Patient's history and physical exam are consistent with a UTI.  No CVA tenderness, no fever, no abdominal pain.    Urine dip was cloudy and positive for UTI.    Urine culture from 7/31 at gynecology office shows >100,000 CFU/ML Morganella morganii ssp morganii.  Resistant to multiple antibiotics.  Not tolerate Bactrim that was prescribed yesterday.  Based on culture results from 7/31 I will treat with Cipro.  Prescription was sent for Cipro to treat UTI.  Repeat urine culture was sent and will follow results.    Also had bacterial vaginosis swab done on 7/31 with an inconclusive BV test.  She was negative for yeast.  I did reswab the patient for vaginosis panel today and will follow results.  Recommended to the  patient to take a probiotic to prevent yeast infections.  Recommended that the patient increase p.o. fluid intake, take Tylenol and Motrin for pain or fever.  I recommended that if the patient develops any abdominal pain, flank pain, back pain, vomiting, high fever that does not resolve with medications or any other concerning complaints they should go to the emergency department.  Recommended the patient make an appointment to follow-up with gynecologist primary care provider.  ED precautions discussed.  Patient (guardian) advised to follow up with PCP in 2-3 days.  Patient (guardian) agrees with this plan of care.  Patient (guardian) verbalizes understanding of discharge instructions and plan of care.      Amount and/or Complexity of Data Reviewed  Labs: ordered. Decision-making details documented in ED Course.    Risk  OTC drugs.  Prescription drug management.        Disposition and Plan     Clinical Impression:  1. Acute cystitis with hematuria         Disposition:  Discharge  8/6/2024  6:08 pm    Follow-up:  Aba García  [address] [place] [id number] [phone]                Medications Prescribed:  Discharge Medication List as of 8/6/2024  6:31 PM        START taking these medications    Details   ciprofloxacin 500 MG Oral Tab Take 1 tablet (500 mg total) by mouth 2 (two) times daily for 7 days., Normal, Disp-14 tablet, R-0

## 2024-08-06 NOTE — DISCHARGE INSTRUCTIONS
Lewis orina mostró signos de latonia infección del tracto urinario.  Cultivo de orina de lewis médico de hace unos días y le recetó un nuevo antibiótico.  Debe comer cuando dedra efe antibiótico, ya que a veces puede causar malestar estomacal. Volveremos a enviar la orina para cultivo y lo llamaremos en 2 días si es necesario cambiar el antibiótico.  También envié el hisopo de vaginosis porque el hisopo de vaginosis bacteriana de lewis ginecólogo inclusive.  Por favor karmen muchos líquidos.  Vaiden Tylenol o Motrin para el dolor o la fiebre.  Puede jyotsna un probiótico para prevenir las infecciones por hongos.  Si desarrolla algún dolor abdominal, dolor de espalda, vómitos o cualquier otro síntoma preocupante, debe acudir al servicio de urgencias.  En sobeida contrario haz seguimiento con tu ginecólogo y médico de atención primaria.      Your urine showed signs of a urinary tract infection.  Urine culture from your doctor from a few days ago and prescribed a new antibiotic.  You should eat when you take this antibiotic as it can sometimes cause stomach upset. We will re-send the urine for culture and call you in 2 days if the antibiotic needs to change.  I also sent the swab for vaginosis because the bacterial vaginosis swab from your GYNE inclusive.  Please drink lots of fluids.  Take Tylenol or Motrin for pain or fever.  You can take a probiotic to prevent yeast infections.  If you develop any abdominal pain, back pain, vomiting or any other concerning complaints you should go to the emergency department.  Otherwise follow-up with your gynecologist and primary care doctor.    
denies pain/discomfort (Rating = 0)

## 2024-08-07 LAB
BV BACTERIA DNA VAG QL NAA+PROBE: NEGATIVE
C GLABRATA DNA VAG QL NAA+PROBE: NEGATIVE
C KRUSEI DNA VAG QL NAA+PROBE: NEGATIVE
CANDIDA DNA VAG QL NAA+PROBE: NEGATIVE
T VAGINALIS DNA VAG QL NAA+PROBE: NEGATIVE

## (undated) NOTE — ED AVS SNAPSHOT
Richard Manrqiuez   MRN: [id number]    Department:  [facility] Emergency Department   Date of Visit:  8/12/2018           Disclosure     Insurance plans vary and the physician(s) referred by the ER may not be covered by your plan.  Please contact your within the next three months to obtain basic health screening including reassessment of your blood pressure.     IF THERE IS ANY CHANGE OR WORSENING OF YOUR CONDITION, CALL YOUR PRIMARY CARE PHYSICIAN AT ONCE OR RETURN IMMEDIATELY TO THE EMERGENCY DEPARTMEN

## (undated) NOTE — ED AVS SNAPSHOT
Catalina Plata   MRN: [id number]    Department:  [facility] Emergency Department   Date of Visit:  10/1/2017           Disclosure     Insurance plans vary and the physician(s) referred by the ER may not be covered by your plan.  Please contact your CARE PHYSICIAN AT ONCE OR RETURN IMMEDIATELY TO THE EMERGENCY DEPARTMENT. If you have been prescribed any medication(s), please fill your prescription right away and begin taking the medication(s) as directed.   If you believe that any of the medications